# Patient Record
Sex: MALE | Race: WHITE | NOT HISPANIC OR LATINO | Employment: FULL TIME | URBAN - METROPOLITAN AREA
[De-identification: names, ages, dates, MRNs, and addresses within clinical notes are randomized per-mention and may not be internally consistent; named-entity substitution may affect disease eponyms.]

---

## 2017-01-08 ENCOUNTER — PATIENT MESSAGE (OUTPATIENT)
Dept: RHEUMATOLOGY | Facility: CLINIC | Age: 52
End: 2017-01-08

## 2017-01-08 DIAGNOSIS — M05.79 RHEUMATOID ARTHRITIS INVOLVING MULTIPLE SITES WITH POSITIVE RHEUMATOID FACTOR: Chronic | ICD-10-CM

## 2017-01-08 DIAGNOSIS — M05.79 RHEUMATOID ARTHRITIS INVOLVING MULTIPLE SITES WITH POSITIVE RHEUMATOID FACTOR: Primary | ICD-10-CM

## 2017-01-09 RX ORDER — METHOTREXATE 2.5 MG/1
20 TABLET ORAL
Qty: 96 TABLET | Refills: 1 | Status: SHIPPED | OUTPATIENT
Start: 2017-01-09 | End: 2017-01-30 | Stop reason: SDUPTHER

## 2017-01-09 RX ORDER — SULFASALAZINE 500 MG/1
1000 TABLET, DELAYED RELEASE ORAL 2 TIMES DAILY
Qty: 360 TABLET | Refills: 1 | Status: SHIPPED | OUTPATIENT
Start: 2017-01-09 | End: 2017-04-09

## 2017-01-11 ENCOUNTER — PATIENT MESSAGE (OUTPATIENT)
Dept: RHEUMATOLOGY | Facility: CLINIC | Age: 52
End: 2017-01-11

## 2017-01-23 ENCOUNTER — PATIENT MESSAGE (OUTPATIENT)
Dept: RHEUMATOLOGY | Facility: CLINIC | Age: 52
End: 2017-01-23

## 2017-01-23 DIAGNOSIS — M06.9 RHEUMATOID ARTHRITIS FLARE: ICD-10-CM

## 2017-01-23 DIAGNOSIS — M05.79 RHEUMATOID ARTHRITIS INVOLVING MULTIPLE SITES WITH POSITIVE RHEUMATOID FACTOR: Primary | ICD-10-CM

## 2017-01-27 LAB
M TB IFN-G CD4+ BCKGRND COR BLD-ACNC: <0 IU/ML
M TB IFN-G CD4+ T-CELLS BLD-ACNC: 0.04 IU/ML
M TB TUBERC IFN-G BLD QL: NEGATIVE
M TB TUBERC IGNF/MITOGEN IGNF CONTROL: >10 IU/ML

## 2017-01-29 LAB
HBV SURFACE AG SERPL IA-ACNC: <0.05 IU/ML
HCV AB S/CO SERPL IA: 0.03
HCV AB SERPL QL IA: NORMAL

## 2017-01-30 ENCOUNTER — TELEPHONE (OUTPATIENT)
Dept: RHEUMATOLOGY | Facility: CLINIC | Age: 52
End: 2017-01-30

## 2017-01-30 ENCOUNTER — CLINICAL SUPPORT (OUTPATIENT)
Dept: RHEUMATOLOGY | Facility: CLINIC | Age: 52
End: 2017-01-30
Payer: OTHER GOVERNMENT

## 2017-01-30 DIAGNOSIS — M05.79 RHEUMATOID ARTHRITIS INVOLVING MULTIPLE SITES WITH POSITIVE RHEUMATOID FACTOR: Primary | ICD-10-CM

## 2017-01-30 DIAGNOSIS — M05.79 RHEUMATOID ARTHRITIS INVOLVING MULTIPLE SITES WITH POSITIVE RHEUMATOID FACTOR: Chronic | ICD-10-CM

## 2017-01-30 PROCEDURE — 99999 PR PBB SHADOW E&M-EST. PATIENT-LVL I: CPT | Mod: PBBFAC,,,

## 2017-01-30 RX ORDER — METHOTREXATE 2.5 MG/1
25 TABLET ORAL
Qty: 120 TABLET | Refills: 1 | Status: SHIPPED | OUTPATIENT
Start: 2017-01-30 | End: 2017-02-13 | Stop reason: SDUPTHER

## 2017-01-30 NOTE — MR AVS SNAPSHOT
[facility] Rheumatology  [address] [facility] [place] [address]  Phone: [phone]  Fax: [phone]                  Byron Gutiérrez   2017 10:30 AM   Clinical Support    Description:  Male : 1965   [facility]   Department:  [facility] - Rheumatology                To Do List           Future Appointments        Provider Department Dept Phone    3/13/2017 1:00 PM Salty Hurd MD [facility] Rheumatology [phone]      Goals (5 Years of Data)     None      [facility]     [facility] -  Assistance  Registered nurses in the [facility] On Call Center provide clinical advisement, health education, appointment booking, and other advisory services.  Call for this free service at [phone].             Medications           Message regarding Medications     Verify the changes and/or additions to your medication regime listed below are the same as discussed with your clinician today.  If any of these changes or additions are incorrect, please notify your healthcare provider.             Verify that the below list of medications is an accurate representation of the medications you are currently taking.  If none reported, the list may be blank. If incorrect, please contact your healthcare provider. Carry this list with you in case of emergency.           Current Medications     adalimumab (HUMIRA) PnKt injection Inject 0.8 mLs (40 mg total) into the skin every 14 (fourteen) days.    DIPHENHYDRAMINE HCL (BENADRYL ALLERGY ORAL) Take by mouth.    folic acid (FOLVITE) 1 MG tablet Take 1 tablet (1 mg total) by mouth once daily.    methotrexate 2.5 MG Tab Take 8 tablets (20 mg total) by mouth every 7 days.    predniSONE (DELTASONE) 5 MG tablet Take 1 tablet (5 mg total) by mouth once daily.    sulfaSALAzine (AZULFIDINE) 500 MG TbEC Take 2 tablets (1,000 mg total) by mouth 2 (two) times daily.           Clinical Reference Information            Allergies as of 1/30/2017     Augmentin [Amoxicillin-pot Clavulanate]      Immunizations Administered on Date of Encounter - 1/30/2017     None      Instructions      Adalimumab Solution for injection  What is this medicine?  ADALIMUMAB (a brandon GASCA mu mab) is used to treat rheumatoid and psoriatic arthritis. It is also used to treat ankylosing spondylitis, Crohn's disease, ulcerative colitis, plaque psoriasis, hidradenitis suppurativa, and uveitis.  This medicine may be used for other purposes; ask your health care provider or pharmacist if you have questions.  What should I tell my health care provider before I take this medicine?  They need to know if you have any of these conditions:  · diabetes  · heart disease  · hepatitis B or history of hepatitis B infection  · immune system problems  · infection or history of infections  · multiple sclerosis  · recently received or scheduled to receive a vaccine  · scheduled to have surgery  · tuberculosis, a positive skin test for tuberculosis or have recently been in close contact with someone who has tuberculosis  · an unusual reaction to adalimumab, other medicines, mannitol, latex, rubber, foods, dyes, or preservatives  · pregnant or trying to get pregnant  · breast-feeding  How should I use this medicine?  This medicine is for injection under the skin. You will be taught how to prepare and give this medicine. Use exactly as directed. Take your medicine at regular intervals. Do not take your medicine more often than directed.  A special MedGuide will be given to you by the pharmacist with each prescription and refill. Be sure to read this information carefully each time.  It is important that you put your used needles and syringes in a special sharps container. Do not put them in a trash can. If you do not have a sharps container, call your pharmacist or healthcare provider to get one.  Talk to your pediatrician regarding the use of this medicine in children. While  this drug may be prescribed for children as young as 2 years for selected conditions, precautions do apply.  The  of the medicine offers free information to patients and their health care partners. Call [phone] for more information.  Overdosage: If you think you have taken too much of this medicine contact a poison control center or emergency room at once.  NOTE: This medicine is only for you. Do not share this medicine with others.  What if I miss a dose?  If you miss a dose, take it as soon as you can. If it is almost time for your next dose, take only that dose. Do not take double or extra doses. Give the next dose when your next scheduled dose is due. Call your doctor or health care professional if you are not sure how to handle a missed dose.  What may interact with this medicine?  Do not take this medicine with any of the following medications:  · abatacept  · anakinra  · etanercept  · infliximab  · live virus vaccines  · rilonacept  This medicine may also interact with the following medications:  · vaccines  This list may not describe all possible interactions. Give your health care provider a list of all the medicines, herbs, non-prescription drugs, or dietary supplements you use. Also tell them if you smoke, drink alcohol, or use illegal drugs. Some items may interact with your medicine.  What should I watch for while using this medicine?  Visit your doctor or health care professional for regular checks on your progress. Tell your doctor or healthcare professional if your symptoms do not start to get better or if they get worse.  You will be tested for tuberculosis (TB) before you start this medicine. If your doctor prescribes any medicine for TB, you should start taking the TB medicine before starting this medicine. Make sure to finish the full course of TB medicine.  Call your doctor or health care professional if you get a cold or other infection while receiving this medicine. Do  not treat yourself. This medicine may decrease your body's ability to fight infection.  Talk to your doctor about your risk of cancer. You may be more at risk for certain types of cancers if you take this medicine.  What side effects may I notice from receiving this medicine?  Side effects that you should report to your doctor or health care professional as soon as possible:  · allergic reactions like skin rash, itching or hives, swelling of the face, lips, or tongue  · breathing problems  · changes in vision  · chest pain  · fever, chills, or any other sign of infection  · numbness or tingling  · red, scaly patches or raised bumps on the skin  · swelling of the ankles  · swollen lymph nodes in the neck, underarm, or groin areas  · unexplained weight loss  · unusual bleeding or bruising  · unusually weak or tired  Side effects that usually do not require medical attention (report to your doctor or health care professional if they continue or are bothersome):  · headache  · nausea  · redness, itching, swelling, or bruising at site where injected  This list may not describe all possible side effects. Call your doctor for medical advice about side effects. You may report side effects to FDA at 9-221-FDA-6745.  Where should I keep my medicine?  Keep out of the reach of children.  Store in the original container and in the refrigerator between 2 and 8 degrees C (36 and 46 degrees F). Do not freeze. The product may be stored in a cool carrier with an ice pack, if needed. Protect from light. Throw away any unused medicine after the expiration date.  NOTE:This sheet is a summary. It may not cover all possible information. If you have questions about this medicine, talk to your doctor, pharmacist, or health care provider. Copyright© 2016 Gold Standard

## 2017-01-30 NOTE — PROGRESS NOTES
Instructed patient on how to self administer Humira injection with training pen. Pt did return demonstration with training pen as well. Signed patient up for Humira Ambassador program. Advised patient that once he receives the injection if he has any questions to give up a call or he can go to the Humira web site for instructional videos. Pt verbalized understanding.

## 2017-01-30 NOTE — PATIENT INSTRUCTIONS
Adalimumab Solution for injection  What is this medicine?  ADALIMUMAB (a brandon AYE mu mab) is used to treat rheumatoid and psoriatic arthritis. It is also used to treat ankylosing spondylitis, Crohn's disease, ulcerative colitis, plaque psoriasis, hidradenitis suppurativa, and uveitis.  This medicine may be used for other purposes; ask your health care provider or pharmacist if you have questions.  What should I tell my health care provider before I take this medicine?  They need to know if you have any of these conditions:  · diabetes  · heart disease  · hepatitis B or history of hepatitis B infection  · immune system problems  · infection or history of infections  · multiple sclerosis  · recently received or scheduled to receive a vaccine  · scheduled to have surgery  · tuberculosis, a positive skin test for tuberculosis or have recently been in close contact with someone who has tuberculosis  · an unusual reaction to adalimumab, other medicines, mannitol, latex, rubber, foods, dyes, or preservatives  · pregnant or trying to get pregnant  · breast-feeding  How should I use this medicine?  This medicine is for injection under the skin. You will be taught how to prepare and give this medicine. Use exactly as directed. Take your medicine at regular intervals. Do not take your medicine more often than directed.  A special MedGuide will be given to you by the pharmacist with each prescription and refill. Be sure to read this information carefully each time.  It is important that you put your used needles and syringes in a special sharps container. Do not put them in a trash can. If you do not have a sharps container, call your pharmacist or healthcare provider to get one.  Talk to your pediatrician regarding the use of this medicine in children. While this drug may be prescribed for children as young as 2 years for selected conditions, precautions do apply.  The  of the medicine offers free information to  patients and their health care partners. Call [phone] for more information.  Overdosage: If you think you have taken too much of this medicine contact a poison control center or emergency room at once.  NOTE: This medicine is only for you. Do not share this medicine with others.  What if I miss a dose?  If you miss a dose, take it as soon as you can. If it is almost time for your next dose, take only that dose. Do not take double or extra doses. Give the next dose when your next scheduled dose is due. Call your doctor or health care professional if you are not sure how to handle a missed dose.  What may interact with this medicine?  Do not take this medicine with any of the following medications:  · abatacept  · anakinra  · etanercept  · infliximab  · live virus vaccines  · rilonacept  This medicine may also interact with the following medications:  · vaccines  This list may not describe all possible interactions. Give your health care provider a list of all the medicines, herbs, non-prescription drugs, or dietary supplements you use. Also tell them if you smoke, drink alcohol, or use illegal drugs. Some items may interact with your medicine.  What should I watch for while using this medicine?  Visit your doctor or health care professional for regular checks on your progress. Tell your doctor or healthcare professional if your symptoms do not start to get better or if they get worse.  You will be tested for tuberculosis (TB) before you start this medicine. If your doctor prescribes any medicine for TB, you should start taking the TB medicine before starting this medicine. Make sure to finish the full course of TB medicine.  Call your doctor or health care professional if you get a cold or other infection while receiving this medicine. Do not treat yourself. This medicine may decrease your body's ability to fight infection.  Talk to your doctor about your risk of cancer. You may be more at risk for certain  types of cancers if you take this medicine.  What side effects may I notice from receiving this medicine?  Side effects that you should report to your doctor or health care professional as soon as possible:  · allergic reactions like skin rash, itching or hives, swelling of the face, lips, or tongue  · breathing problems  · changes in vision  · chest pain  · fever, chills, or any other sign of infection  · numbness or tingling  · red, scaly patches or raised bumps on the skin  · swelling of the ankles  · swollen lymph nodes in the neck, underarm, or groin areas  · unexplained weight loss  · unusual bleeding or bruising  · unusually weak or tired  Side effects that usually do not require medical attention (report to your doctor or health care professional if they continue or are bothersome):  · headache  · nausea  · redness, itching, swelling, or bruising at site where injected  This list may not describe all possible side effects. Call your doctor for medical advice about side effects. You may report side effects to FDA at 9-247-FDA-1144.  Where should I keep my medicine?  Keep out of the reach of children.  Store in the original container and in the refrigerator between 2 and 8 degrees C (36 and 46 degrees F). Do not freeze. The product may be stored in a cool carrier with an ice pack, if needed. Protect from light. Throw away any unused medicine after the expiration date.  NOTE:This sheet is a summary. It may not cover all possible information. If you have questions about this medicine, talk to your doctor, pharmacist, or health care provider. Copyright© 2016 Gold Standard

## 2017-01-30 NOTE — TELEPHONE ENCOUNTER
Humira denied by insurance because there has not been an inadequate response to MTX at a maximum titrated dose of greater than or equal to 25 mg per week after at leave months of treatment, or an intolerance confirmed adverse event, or a contraindication to MTX. Please Advise        For Appeals:   [facility] Specialty Appeals Department   [address] [place] [address]  Fax- [phone]

## 2017-02-07 ENCOUNTER — TELEPHONE (OUTPATIENT)
Dept: RHEUMATOLOGY | Facility: CLINIC | Age: 52
End: 2017-02-07

## 2017-02-07 NOTE — TELEPHONE ENCOUNTER
----- Message from Viji Diaz sent at 2/7/2017 10:16 AM CST -----  Contact: [facility] // Monisha  Calling for prior authorization of Humira 40 mg for patient. Please call Monisha @ [phone]. Thanks, godfrey

## 2017-02-13 ENCOUNTER — PATIENT MESSAGE (OUTPATIENT)
Dept: RHEUMATOLOGY | Facility: CLINIC | Age: 52
End: 2017-02-13

## 2017-02-13 DIAGNOSIS — M05.79 RHEUMATOID ARTHRITIS INVOLVING MULTIPLE SITES WITH POSITIVE RHEUMATOID FACTOR: Chronic | ICD-10-CM

## 2017-02-13 RX ORDER — METHOTREXATE 2.5 MG/1
25 TABLET ORAL
Qty: 120 TABLET | Refills: 1 | Status: SHIPPED | OUTPATIENT
Start: 2017-02-13 | End: 2017-09-08 | Stop reason: SDUPTHER

## 2017-03-13 ENCOUNTER — OFFICE VISIT (OUTPATIENT)
Dept: RHEUMATOLOGY | Facility: CLINIC | Age: 52
End: 2017-03-13
Payer: OTHER GOVERNMENT

## 2017-03-13 VITALS
SYSTOLIC BLOOD PRESSURE: 129 MMHG | DIASTOLIC BLOOD PRESSURE: 75 MMHG | WEIGHT: 177.5 LBS | BODY MASS INDEX: 29.53 KG/M2 | HEART RATE: 85 BPM

## 2017-03-13 DIAGNOSIS — M05.79 RHEUMATOID ARTHRITIS INVOLVING MULTIPLE SITES WITH POSITIVE RHEUMATOID FACTOR: Primary | ICD-10-CM

## 2017-03-13 DIAGNOSIS — Z79.899 LONG-TERM CURRENT USE OF HIGH RISK MEDICATION OTHER THAN ANTICOAGULANT: ICD-10-CM

## 2017-03-13 PROCEDURE — 1160F RVW MEDS BY RX/DR IN RCRD: CPT | Mod: S$GLB,,, | Performed by: INTERNAL MEDICINE

## 2017-03-13 PROCEDURE — 99214 OFFICE O/P EST MOD 30 MIN: CPT | Mod: S$GLB,,, | Performed by: INTERNAL MEDICINE

## 2017-03-13 PROCEDURE — 99999 PR PBB SHADOW E&M-EST. PATIENT-LVL III: CPT | Mod: PBBFAC,,, | Performed by: INTERNAL MEDICINE

## 2017-03-13 ASSESSMENT — ROUTINE ASSESSMENT OF PATIENT INDEX DATA (RAPID3): MDHAQ FUNCTION SCORE: .9

## 2017-03-13 NOTE — PROGRESS NOTES
RHEUMATOLOGY CLINIC FOLLOW UP VISIT  Chief complaints:-  My left hand hurts today.    HPI:-  Byron Garza a 51 y.o. pleasant male comes in for a follow up visit with above chief complaints. He has seropositive non erosive rheumatoid arthritis. He is on methotrexate 25 mg weekly , SSZ twice daily with prn 5 mg prednisone. He was doing fine until the weather became very cold which has caused him to have intermittent flare ups over his left foot, right wrist and hand. Today his right fourth andd fifth MCP is swollen and painful. His insurance denied humira request since he haven't been on 25 mg weekly methotrexate for more than 12 weeks. He denies any side effects from the medications except a mild increase in his fatigue in the past month or so.     Review of Systems   Constitutional: Positive for malaise/fatigue. Negative for chills, fever and weight loss.   HENT: Negative for ear discharge, ear pain, hearing loss, nosebleeds and sore throat.    Eyes: Negative for blurred vision, double vision, photophobia, discharge and redness.   Respiratory: Negative for cough, hemoptysis, sputum production and shortness of breath.    Cardiovascular: Negative for chest pain, palpitations and claudication.   Gastrointestinal: Negative for abdominal pain, constipation, diarrhea, melena, nausea and vomiting.   Genitourinary: Negative for dysuria, frequency, hematuria and urgency.   Musculoskeletal: Positive for back pain and joint pain. Negative for falls, myalgias and neck pain.   Skin: Negative for itching and rash.   Neurological: Negative for dizziness, tremors, sensory change, speech change, focal weakness, seizures, loss of consciousness, weakness and headaches.   Endo/Heme/Allergies: Negative for environmental allergies. Does not bruise/bleed easily.   Psychiatric/Behavioral: Negative for hallucinations and memory loss. The patient does not have insomnia.         Past Medical History:   Diagnosis Date    Asthma     Cluster headache     Eczema        Past Surgical History:   Procedure Laterality Date    APPENDECTOMY          Social History   Substance Use Topics    Smoking status: Former Smoker     Packs/day: 1.00     Years: 35.00     Types: Cigarettes    Smokeless tobacco: Former User    Alcohol use No       Family History   Problem Relation Age of Onset    Multiple sclerosis Mother     No Known Problems Father     Cancer Maternal Grandmother      Breast    Stroke Maternal Grandfather     Cancer Paternal Grandmother      Stomach    Heart attack Paternal Grandfather        Review of patient's allergies indicates:   Allergen Reactions    Augmentin [amoxicillin-pot clavulanate] Rash           Physical examination:-    Vitals:    03/13/17 1318   BP: 129/75   Pulse: 85   Weight: 80.5 kg (177 lb 7.5 oz)   PainSc:   3       Physical Exam   Constitutional: He is oriented to person, place, and time and well-developed, well-nourished, and in no distress. No distress.   HENT:   Head: Normocephalic and atraumatic.   Mouth/Throat: Oropharynx is clear and moist.   Eyes: Conjunctivae and EOM are normal. Pupils are equal, round, and reactive to light.   Neck: Normal range of motion. Neck supple.   Cardiovascular: Normal rate and intact distal pulses.    Pulmonary/Chest: Effort normal. No respiratory distress. He exhibits no tenderness.   Abdominal: Soft. There is no tenderness.   Musculoskeletal:   Mild synovitis over right fourth and fifth MCP joints. No other synovitis present. No effusion over large joints.    Lymphadenopathy:     He has no cervical adenopathy.   Neurological: He is alert and oriented to person, place, and time. Gait normal.   Skin: Skin is warm. He is not diaphoretic. No erythema.   Old healed skin rash markings over the pretibial region of both legs.   Psychiatric: Memory, affect and judgment normal.   Nursing note and vitals  reviewed.      Labs:-      X-rays:-  Independent visualization of images done. No erosions over hands, feet, wrists or ankles.       Medication List with Changes/Refills   Current Medications    DIPHENHYDRAMINE HCL (BENADRYL ALLERGY ORAL)    Take by mouth.    FOLIC ACID (FOLVITE) 1 MG TABLET    Take 1 tablet (1 mg total) by mouth once daily.    METHOTREXATE 2.5 MG TAB    Take 10 tablets (25 mg total) by mouth every 7 days.    PREDNISONE (DELTASONE) 5 MG TABLET    Take 1 tablet (5 mg total) by mouth once daily.    SULFASALAZINE (AZULFIDINE) 500 MG TBEC    Take 2 tablets (1,000 mg total) by mouth 2 (two) times daily.   Discontinued Medications    ADALIMUMAB (HUMIRA) PNKT INJECTION    Inject 0.8 mLs (40 mg total) into the skin every 14 (fourteen) days.       Assessment/Plans:-  # Rheumatoid arthritis involving multiple sites with positive rheumatoid factor:-  Mildly active Rheumatoid arthritis . Continue 25 mg weekly methotrexate and twice daily SSZ. Increase folic acid to twice daily and evaluate for any reduction in the fatigue. If active disease in 8 weeks, resubmit TNF inhibitor request to his insurance company.      # Long-term current use of high risk medication other than anticoagulant:-  Check every 12 week safety labs for MTX and SSZ. He gets labs done at outside labs as per his insurance company's request. External lab orders printed.   - CBC auto differential; Future  - Comprehensive metabolic panel; Future       # RTC in 3 months.     Disclaimer: This note was prepared using voice recognition system and is likely to have sound alike errors .  Please call me with any questions

## 2017-03-13 NOTE — MR AVS SNAPSHOT
[facility] - Rheumatology  [address] [facility] [place] [address]  Phone: [phone]  Fax: [phone]                  Byron Gutiérrez   3/13/2017 1:00 PM   Office Visit    Description:  Male : 1965   Provider:  Salty Hurd MD   Department:  [facility] - Rheumatology           Reason for Visit     Rheumatoid Arthritis           Diagnoses this Visit        Comments    Rheumatoid arthritis involving multiple sites with positive rheumatoid factor    -  Primary     Long-term current use of high risk medication other than anticoagulant                To Do List           Goals (5 Years of Data)     None      Follow-Up and Disposition     Return in about 3 months (around 2017).      [facility]     [facility] On Call Nurse [facility] -  Assistance  Registered nurses in the [facility] On Call Center provide clinical advisement, health education, appointment booking, and other advisory services.  Call for this free service at [phone].             Medications           Message regarding Medications     Verify the changes and/or additions to your medication regime listed below are the same as discussed with your clinician today.  If any of these changes or additions are incorrect, please notify your healthcare provider.        STOP taking these medications     adalimumab (HUMIRA) PnKt injection Inject 0.8 mLs (40 mg total) into the skin every 14 (fourteen) days.           Verify that the below list of medications is an accurate representation of the medications you are currently taking.  If none reported, the list may be blank. If incorrect, please contact your healthcare provider. Carry this list with you in case of emergency.           Current Medications     DIPHENHYDRAMINE HCL (BENADRYL ALLERGY ORAL) Take by mouth.    folic acid (FOLVITE) 1 MG tablet Take 1 tablet (1 mg total) by mouth once daily.    methotrexate 2.5 MG Tab Take 10 tablets (25 mg total) by mouth every 7 days.     sulfaSALAzine (AZULFIDINE) 500 MG TbEC Take 2 tablets (1,000 mg total) by mouth 2 (two) times daily.    predniSONE (DELTASONE) 5 MG tablet Take 1 tablet (5 mg total) by mouth once daily.           Clinical Reference Information           Your Vitals Were     BP Pulse Weight BMI       129/75 85 80.5 kg (177 lb 7.5 oz) 29.53 kg/m2       Blood Pressure          Most Recent Value    BP  129/75      Allergies as of 3/13/2017     Augmentin [Amoxicillin-pot Clavulanate]      Immunizations Administered on Date of Encounter - 3/13/2017     None      Orders Placed During Today's Visit     Future Labs/Procedures Expected by Expires    CBC auto differential  3/13/2017 5/12/2018    Comprehensive metabolic panel  3/13/2017 5/12/2018      Language Assistance Services     ATTENTION: Language assistance services are available, free of charge. Please call [phone].      ATENCIÓN: Si habla español, tiene a chan disposición servicios gratuitos de asistencia lingüística. Llame al [phone].     CHÚ Ý: N?u b?n nói Ti?ng Vi?t, có các d?ch v? h? tr? ngôn ng? mi?n phí dành cho b?n. G?i s? [phone].         [facility] - Rheumatology complies with applicable Federal civil rights laws and does not discriminate on the basis of race, color, national origin, age, disability, or sex.

## 2017-03-15 ENCOUNTER — PATIENT MESSAGE (OUTPATIENT)
Dept: RHEUMATOLOGY | Facility: CLINIC | Age: 52
End: 2017-03-15

## 2017-04-14 ENCOUNTER — PATIENT MESSAGE (OUTPATIENT)
Dept: RHEUMATOLOGY | Facility: CLINIC | Age: 52
End: 2017-04-14

## 2017-04-14 DIAGNOSIS — T45.1X5A: Primary | ICD-10-CM

## 2017-04-14 DIAGNOSIS — M05.79 RHEUMATOID ARTHRITIS INVOLVING MULTIPLE SITES WITH POSITIVE RHEUMATOID FACTOR: Chronic | ICD-10-CM

## 2017-04-17 RX ORDER — FOLIC ACID 1 MG/1
1 TABLET ORAL 2 TIMES DAILY
Qty: 180 TABLET | Refills: 3 | Status: SHIPPED | OUTPATIENT
Start: 2017-04-17 | End: 2017-04-18 | Stop reason: SDUPTHER

## 2017-04-18 ENCOUNTER — PATIENT MESSAGE (OUTPATIENT)
Dept: RHEUMATOLOGY | Facility: CLINIC | Age: 52
End: 2017-04-18

## 2017-04-18 DIAGNOSIS — M05.79 RHEUMATOID ARTHRITIS INVOLVING MULTIPLE SITES WITH POSITIVE RHEUMATOID FACTOR: Chronic | ICD-10-CM

## 2017-04-18 DIAGNOSIS — T45.1X5A: ICD-10-CM

## 2017-04-18 RX ORDER — FOLIC ACID 1 MG/1
1 TABLET ORAL 2 TIMES DAILY
Qty: 180 TABLET | Refills: 3 | Status: SHIPPED | OUTPATIENT
Start: 2017-04-18 | End: 2018-01-24 | Stop reason: SDUPTHER

## 2017-07-07 ENCOUNTER — OFFICE VISIT (OUTPATIENT)
Dept: RHEUMATOLOGY | Facility: CLINIC | Age: 52
End: 2017-07-07
Payer: OTHER GOVERNMENT

## 2017-07-07 VITALS
DIASTOLIC BLOOD PRESSURE: 69 MMHG | BODY MASS INDEX: 30.01 KG/M2 | SYSTOLIC BLOOD PRESSURE: 119 MMHG | WEIGHT: 180.31 LBS | HEART RATE: 86 BPM

## 2017-07-07 DIAGNOSIS — Z79.899 LONG-TERM CURRENT USE OF HIGH RISK MEDICATION OTHER THAN ANTICOAGULANT: ICD-10-CM

## 2017-07-07 DIAGNOSIS — M05.79 RHEUMATOID ARTHRITIS INVOLVING MULTIPLE SITES WITH POSITIVE RHEUMATOID FACTOR: Primary | ICD-10-CM

## 2017-07-07 PROCEDURE — 99214 OFFICE O/P EST MOD 30 MIN: CPT | Mod: S$GLB,,, | Performed by: INTERNAL MEDICINE

## 2017-07-07 PROCEDURE — 99999 PR PBB SHADOW E&M-EST. PATIENT-LVL III: CPT | Mod: PBBFAC,,, | Performed by: INTERNAL MEDICINE

## 2017-07-07 RX ORDER — SULFASALAZINE 500 MG/1
500 TABLET, DELAYED RELEASE ORAL 2 TIMES DAILY
Qty: 60 TABLET | Refills: 2 | Status: SHIPPED | OUTPATIENT
Start: 2017-09-07 | End: 2017-12-29 | Stop reason: SDUPTHER

## 2017-07-07 ASSESSMENT — ROUTINE ASSESSMENT OF PATIENT INDEX DATA (RAPID3): MDHAQ FUNCTION SCORE: 1.8

## 2017-07-07 NOTE — ASSESSMENT & PLAN NOTE
On methotrexate and sulfasalazine therapy.  High risk for hepatic and pulmonary toxicity.  Check safety labs today.  Repeat every 16 weeks.

## 2017-07-07 NOTE — PROGRESS NOTES
RHEUMATOLOGY CLINIC FOLLOW UP VISIT  Chief complaints:-  My shoulders and knees and ankles are stiff.  HPI:-  Byron Garza a 51 y.o. pleasant male comes in for a follow up visit with above chief complaints.  He has seropositive nonerosive rheumatoid arthritis.  He is on methotrexate and sulfasalazine therapy with 5 mg daily prednisone.  He reports intermittent activity of his rheumatoid arthritis.  He usually gets large joint involvement.  Today morning he woke up with no pain but after driving down to the clinic he has stiffness around his shoulders, knees and ankles.  He denies any pain over small joints of hands or feet.  No adverse effects from the medications.  Tolerating both very well.      Review of Systems   Constitutional: Negative for chills and fever.   HENT: Negative for nosebleeds and sore throat.    Eyes: Negative for blurred vision, photophobia and redness.   Respiratory: Negative for cough, sputum production and shortness of breath.    Cardiovascular: Negative for chest pain and leg swelling.   Gastrointestinal: Negative for abdominal pain, constipation and diarrhea.   Genitourinary: Negative for dysuria, frequency and urgency.   Musculoskeletal: Positive for joint pain. Negative for back pain, falls, myalgias and neck pain.   Skin: Negative for itching and rash.   Neurological: Negative for dizziness, tremors, seizures, loss of consciousness, weakness and headaches.   Endo/Heme/Allergies: Negative for environmental allergies. Does not bruise/bleed easily.   Psychiatric/Behavioral: Negative for hallucinations and memory loss. The patient does not have insomnia.        Past Medical History:   Diagnosis Date    Asthma     Cluster headache     Eczema        Past Surgical History:   Procedure Laterality Date    APPENDECTOMY          Social History   Substance Use Topics    Smoking status: Former Smoker     Packs/day: 1.00     Years:  35.00     Types: Cigarettes    Smokeless tobacco: Former User    Alcohol use No       Family History   Problem Relation Age of Onset    Multiple sclerosis Mother     No Known Problems Father     Cancer Maternal Grandmother      Breast    Stroke Maternal Grandfather     Cancer Paternal Grandmother      Stomach    Heart attack Paternal Grandfather        Review of patient's allergies indicates:   Allergen Reactions    Augmentin [amoxicillin-pot clavulanate] Rash           Physical examination:-    Vitals:    07/07/17 1357   BP: 119/69   Pulse: 86   Weight: 81.8 kg (180 lb 5.4 oz)   PainSc:   8   PainLoc: Generalized       Physical Exam   Constitutional: He is oriented to person, place, and time and well-developed, well-nourished, and in no distress. No distress.   HENT:   Head: Normocephalic and atraumatic.   Mouth/Throat: Oropharynx is clear and moist.   Eyes: Conjunctivae and EOM are normal. Pupils are equal, round, and reactive to light.   Neck: Normal range of motion. Neck supple.   Cardiovascular: Normal rate and intact distal pulses.    Pulmonary/Chest: Effort normal. No respiratory distress. He exhibits no tenderness.   Abdominal: Soft. There is no tenderness.   Musculoskeletal:   No synovitis over the MCPs, PIPs, DIPs and wrists. No effusion over large joints.  Mild tenderness over bilateral knees and ankles.   Lymphadenopathy:     He has no cervical adenopathy.   Neurological: He is alert and oriented to person, place, and time. Gait normal.   Skin: Skin is warm. He is not diaphoretic. No erythema.   Old healed skin rash markings over the pretibial region of both legs.   Psychiatric: Memory, affect and judgment normal.   Nursing note and vitals reviewed.      Labs:-            Medication List with Changes/Refills   New Medications    SULFASALAZINE (AZULFIDINE) 500 MG TBEC    Take 1 tablet (500 mg total) by mouth 2 (two) times daily.   Current Medications    DIPHENHYDRAMINE HCL (BENADRYL ALLERGY ORAL)     Take by mouth.    FOLIC ACID (FOLVITE) 1 MG TABLET    Take 1 tablet (1 mg total) by mouth 2 (two) times daily.    METHOTREXATE 2.5 MG TAB    Take 10 tablets (25 mg total) by mouth every 7 days.    PREDNISONE (DELTASONE) 5 MG TABLET    Take 1 tablet (5 mg total) by mouth once daily.       Assessment/Plans:-  # Rheumatoid arthritis involving multiple sites with positive rheumatoid factor  Intermittently active rheumatoid arthritis.  On methotrexate and sulfasalazine dual therapy at this time with 5 mg daily prednisone.  No active synovitis on exam today but the palindromic activity suggests initiation of TNF alpha inhibitor therapy.  I discussed in detail about Humira/enbrel.  But he wants to hold off for now since he is not ready to start Biologics at this time.  Continue dual therapy with prednisone.  Call with any worsening symptoms.  - sulfaSALAzine (AZULFIDINE) 500 MG TbEC; Take 1 tablet (500 mg total) by mouth 2 (two) times daily.  Dispense: 60 tablet; Refill: 2  - Comprehensive metabolic panel; Future  - C-reactive protein; Future  - CBC auto differential; Future  - Sedimentation rate, manual; Future    # Long-term current use of high risk medication other than anticoagulant  On methotrexate and sulfasalazine therapy.  High risk for hepatic and pulmonary toxicity.  Check safety labs today.  Repeat every 16 weeks.     # Return in about 4 months (around 11/7/2017).      Time spent: 30 minutes in face to face discussion concerning diagnosis, prognosis, review of lab and test results, benefits of treatment as well as management of disease, counseling of patient and coordination of care between various health care providers . Greater than half the time spent was used for coordination of care and counseling of patient.      Disclaimer: This note was prepared using voice recognition system and is likely to have sound alike errors and is not proof read.  Please call me with any questions.

## 2017-07-07 NOTE — ASSESSMENT & PLAN NOTE
Intermittently active rheumatoid arthritis.  On methotrexate and sulfasalazine dual therapy at this time with 5 mg daily prednisone.  No active synovitis on exam today but the palindromic activity suggests initiation of TNF alpha inhibitor therapy.  I discussed in detail about Humira/enbrel.  But he wants to hold off for now since he is not ready to start Biologics at this time.  Continue dual therapy with prednisone.  Call with any worsening symptoms.

## 2017-09-02 DIAGNOSIS — M05.79 RHEUMATOID ARTHRITIS INVOLVING MULTIPLE SITES WITH POSITIVE RHEUMATOID FACTOR: Chronic | ICD-10-CM

## 2017-09-02 RX ORDER — PREDNISONE 5 MG/1
TABLET ORAL
Qty: 90 TABLET | Refills: 1 | Status: SHIPPED | OUTPATIENT
Start: 2017-09-02 | End: 2018-01-24 | Stop reason: SDUPTHER

## 2017-09-07 ENCOUNTER — PATIENT MESSAGE (OUTPATIENT)
Dept: RHEUMATOLOGY | Facility: CLINIC | Age: 52
End: 2017-09-07

## 2017-09-07 DIAGNOSIS — M05.79 RHEUMATOID ARTHRITIS INVOLVING MULTIPLE SITES WITH POSITIVE RHEUMATOID FACTOR: Chronic | ICD-10-CM

## 2017-09-08 ENCOUNTER — PATIENT MESSAGE (OUTPATIENT)
Dept: RHEUMATOLOGY | Facility: CLINIC | Age: 52
End: 2017-09-08

## 2017-09-08 RX ORDER — METHOTREXATE 2.5 MG/1
25 TABLET ORAL
Qty: 120 TABLET | Refills: 1 | Status: SHIPPED | OUTPATIENT
Start: 2017-09-08 | End: 2018-01-24 | Stop reason: SDUPTHER

## 2017-12-29 DIAGNOSIS — M05.79 RHEUMATOID ARTHRITIS INVOLVING MULTIPLE SITES WITH POSITIVE RHEUMATOID FACTOR: ICD-10-CM

## 2017-12-29 RX ORDER — SULFASALAZINE 500 MG/1
500 TABLET, DELAYED RELEASE ORAL 2 TIMES DAILY
Qty: 60 TABLET | Refills: 2 | Status: SHIPPED | OUTPATIENT
Start: 2017-12-29 | End: 2018-01-24 | Stop reason: SDUPTHER

## 2018-01-24 ENCOUNTER — OFFICE VISIT (OUTPATIENT)
Dept: RHEUMATOLOGY | Facility: CLINIC | Age: 53
End: 2018-01-24
Payer: OTHER GOVERNMENT

## 2018-01-24 VITALS
HEART RATE: 86 BPM | BODY MASS INDEX: 30.05 KG/M2 | WEIGHT: 180.56 LBS | SYSTOLIC BLOOD PRESSURE: 121 MMHG | DIASTOLIC BLOOD PRESSURE: 75 MMHG

## 2018-01-24 DIAGNOSIS — Z79.899 LONG-TERM CURRENT USE OF HIGH RISK MEDICATION OTHER THAN ANTICOAGULANT: ICD-10-CM

## 2018-01-24 DIAGNOSIS — M05.79 RHEUMATOID ARTHRITIS INVOLVING MULTIPLE SITES WITH POSITIVE RHEUMATOID FACTOR: Chronic | ICD-10-CM

## 2018-01-24 DIAGNOSIS — M05.79 RHEUMATOID ARTHRITIS INVOLVING MULTIPLE SITES WITH POSITIVE RHEUMATOID FACTOR: Primary | ICD-10-CM

## 2018-01-24 PROCEDURE — 99215 OFFICE O/P EST HI 40 MIN: CPT | Mod: S$GLB,,, | Performed by: INTERNAL MEDICINE

## 2018-01-24 PROCEDURE — 99999 PR PBB SHADOW E&M-EST. PATIENT-LVL III: CPT | Mod: PBBFAC,,, | Performed by: INTERNAL MEDICINE

## 2018-01-24 RX ORDER — FOLIC ACID 1 MG/1
1 TABLET ORAL 2 TIMES DAILY
Qty: 180 TABLET | Refills: 3 | Status: SHIPPED | OUTPATIENT
Start: 2018-01-24

## 2018-01-24 RX ORDER — METHOTREXATE 2.5 MG/1
25 TABLET ORAL
Qty: 120 TABLET | Refills: 1 | Status: SHIPPED | OUTPATIENT
Start: 2018-01-24 | End: 2018-08-10 | Stop reason: SDUPTHER

## 2018-01-24 RX ORDER — PREDNISONE 5 MG/1
5 TABLET ORAL DAILY
Qty: 90 TABLET | Refills: 0 | Status: SHIPPED | OUTPATIENT
Start: 2018-01-24 | End: 2018-04-16 | Stop reason: SDUPTHER

## 2018-01-24 RX ORDER — SULFASALAZINE 500 MG/1
1000 TABLET, DELAYED RELEASE ORAL 2 TIMES DAILY
Qty: 360 TABLET | Refills: 1 | Status: SHIPPED | OUTPATIENT
Start: 2018-01-24

## 2018-01-24 ASSESSMENT — ROUTINE ASSESSMENT OF PATIENT INDEX DATA (RAPID3): MDHAQ FUNCTION SCORE: 1.5

## 2018-01-24 NOTE — PROGRESS NOTES
RHEUMATOLOGY CLINIC FOLLOW UP VISIT  Chief complaints:-  My shoulders and knees and ankles are stiff. The joints are not doing well .     HPI:-  Byron Garza a 52 y.o. pleasant male comes in for a follow up visit with above chief complaints.  He has seropositive nonerosive rheumatoid arthritis.  He is on methotrexate and sulfasalazine therapy with 5 mg daily prednisone.  He reports intermittent activity of his rheumatoid arthritis.  He usually gets large joint involvement.   He denies any pain over small joints of hands or feet.  No adverse effects from the medications.  Tolerating both very well.  Review of Systems   Constitutional: Negative for chills and fever.   HENT: Negative for nosebleeds and sore throat.    Eyes: Negative for blurred vision, photophobia and redness.   Respiratory: Negative for cough, sputum production and shortness of breath.    Cardiovascular: Negative for chest pain and leg swelling.   Gastrointestinal: Negative for abdominal pain, constipation and diarrhea.   Genitourinary: Negative for dysuria, frequency and urgency.   Musculoskeletal: Positive for joint pain. Negative for back pain, falls, myalgias and neck pain.   Skin: Negative for itching and rash.   Neurological: Negative for dizziness, tremors, seizures, loss of consciousness, weakness and headaches.   Endo/Heme/Allergies: Negative for environmental allergies. Does not bruise/bleed easily.   Psychiatric/Behavioral: Negative for hallucinations and memory loss. The patient does not have insomnia.        Past Medical History:   Diagnosis Date    Asthma     Cluster headache     Eczema        Past Surgical History:   Procedure Laterality Date    APPENDECTOMY          Social History   Substance Use Topics    Smoking status: Former Smoker     Packs/day: 1.00     Years: 35.00     Types: Cigarettes    Smokeless tobacco: Former User    Alcohol use No       Family History    Problem Relation Age of Onset    Multiple sclerosis Mother     No Known Problems Father     Cancer Maternal Grandmother      Breast    Stroke Maternal Grandfather     Cancer Paternal Grandmother      Stomach    Heart attack Paternal Grandfather        Review of patient's allergies indicates:   Allergen Reactions    Augmentin [amoxicillin-pot clavulanate] Rash           Physical examination:-    Vitals:    01/24/18 1125   BP: 121/75   Pulse: 86   Weight: 81.9 kg (180 lb 8.9 oz)   PainSc:   5   PainLoc: Generalized       Physical Exam   Constitutional: He is oriented to person, place, and time and well-developed, well-nourished, and in no distress. No distress.   HENT:   Head: Normocephalic and atraumatic.   Mouth/Throat: Oropharynx is clear and moist.   Eyes: Conjunctivae and EOM are normal. Pupils are equal, round, and reactive to light.   Neck: Normal range of motion. Neck supple.   Cardiovascular: Normal rate and intact distal pulses.    Pulmonary/Chest: Effort normal. No respiratory distress. He exhibits no tenderness.   Abdominal: Soft. There is no tenderness.   Musculoskeletal:   No synovitis over the MCPs, PIPs, DIPs and wrists. No effusion over large joints.  Mild tenderness over bilateral knees and ankles.   Lymphadenopathy:     He has no cervical adenopathy.   Neurological: He is alert and oriented to person, place, and time. Gait normal.   Skin: Skin is warm. He is not diaphoretic. No erythema.   Old healed skin rash markings over the pretibial region of both legs.   Psychiatric: Memory, affect and judgment normal.   Nursing note and vitals reviewed.      Labs:-            Medication List with Changes/Refills   Current Medications    DIPHENHYDRAMINE HCL (BENADRYL ALLERGY ORAL)    Take by mouth.   Changed and/or Refilled Medications    Modified Medication Previous Medication    FOLIC ACID (FOLVITE) 1 MG TABLET folic acid (FOLVITE) 1 MG tablet       Take 1 tablet (1 mg total) by mouth 2 (two) times  daily.    Take 1 tablet (1 mg total) by mouth 2 (two) times daily.    METHOTREXATE 2.5 MG TAB methotrexate 2.5 MG Tab       Take 10 tablets (25 mg total) by mouth every 7 days.    Take 10 tablets (25 mg total) by mouth every 7 days.    PREDNISONE (DELTASONE) 5 MG TABLET predniSONE (DELTASONE) 5 MG tablet       Take 1 tablet (5 mg total) by mouth once daily.    TAKE 1 TABLET ONCE DAILY    SULFASALAZINE (AZULFIDINE) 500 MG TBEC sulfaSALAzine (AZULFIDINE) 500 MG TbEC       Take 2 tablets (1,000 mg total) by mouth 2 (two) times daily.    TAKE 1 TABLET (500 MG TOTAL) BY MOUTH 2 (TWO) TIMES DAILY.       Assessment/Plans:-  # Rheumatoid arthritis involving multiple sites with positive rheumatoid factor  Intermittently active rheumatoid arthritis.  On methotrexate and sulfasalazine dual therapy at this time with 5 mg daily prednisone.  No active synovitis on exam today but the palindromic activity suggests initiation of TNF alpha inhibitor therapy.  I discussed in detail about Humira/enbrel.  But he wants to hold off for now since he is not ready to start Biologics at this time.  Increase dose of SSZ to 1 gram bid.   - sulfaSALAzine (AZULFIDINE) 500 MG TbEC; Take 2 tablets (1,000 mg total) by mouth 2 (two) times daily.  Dispense: 360 tablet; Refill: 1  - methotrexate 2.5 MG Tab; Take 10 tablets (25 mg total) by mouth every 7 days.  Dispense: 120 tablet; Refill: 1  - folic acid (FOLVITE) 1 MG tablet; Take 1 tablet (1 mg total) by mouth 2 (two) times daily.  Dispense: 180 tablet; Refill: 3  - predniSONE (DELTASONE) 5 MG tablet; Take 1 tablet (5 mg total) by mouth once daily.  Dispense: 90 tablet; Refill: 0  - CBC auto differential; Future  - C-reactive protein; Future  - Sedimentation rate, manual; Future  - Comprehensive metabolic panel; Future  - CBC auto differential  - C-reactive protein  - Sedimentation rate, manual  - Comprehensive metabolic panel    # Long-term current use of high risk medication other than  anticoagulant  On methotrexate and sulfasalazine therapy.  High risk for hepatic and pulmonary toxicity.  Check safety labs today.  Repeat every 16 weeks.   - sulfaSALAzine (AZULFIDINE) 500 MG TbEC; Take 2 tablets (1,000 mg total) by mouth 2 (two) times daily.  Dispense: 360 tablet; Refill: 1  - methotrexate 2.5 MG Tab; Take 10 tablets (25 mg total) by mouth every 7 days.  Dispense: 120 tablet; Refill: 1  - folic acid (FOLVITE) 1 MG tablet; Take 1 tablet (1 mg total) by mouth 2 (two) times daily.  Dispense: 180 tablet; Refill: 3  - predniSONE (DELTASONE) 5 MG tablet; Take 1 tablet (5 mg total) by mouth once daily.  Dispense: 90 tablet; Refill: 0  - CBC auto differential; Future  - C-reactive protein; Future  - Sedimentation rate, manual; Future  - Comprehensive metabolic panel; Future  - CBC auto differential  - C-reactive protein  - Sedimentation rate, manual  - Comprehensive metabolic panel    # Follow-up in about 3 months (around 4/24/2018).    Disclaimer: This note was prepared using voice recognition system and is likely to have sound alike errors and is not proof read.  Please call me with any questions.

## 2018-04-02 ENCOUNTER — DOCUMENTATION ONLY (OUTPATIENT)
Dept: RHEUMATOLOGY | Facility: CLINIC | Age: 53
End: 2018-04-02

## 2018-04-02 ENCOUNTER — PATIENT MESSAGE (OUTPATIENT)
Dept: RHEUMATOLOGY | Facility: CLINIC | Age: 53
End: 2018-04-02

## 2018-04-02 DIAGNOSIS — Z79.899 LONG-TERM CURRENT USE OF HIGH RISK MEDICATION OTHER THAN ANTICOAGULANT: ICD-10-CM

## 2018-04-02 DIAGNOSIS — M05.79 RHEUMATOID ARTHRITIS INVOLVING MULTIPLE SITES WITH POSITIVE RHEUMATOID FACTOR: Primary | ICD-10-CM

## 2018-04-02 NOTE — PROGRESS NOTES
Labs received from [facility]. Stable with mild abnormality of AST/ALT- better than last value with high inflammation. Repeat labs at least a week before next visit.

## 2018-04-03 DIAGNOSIS — M05.79 RHEUMATOID ARTHRITIS INVOLVING MULTIPLE SITES WITH POSITIVE RHEUMATOID FACTOR: Primary | ICD-10-CM

## 2018-04-15 DIAGNOSIS — M05.79 RHEUMATOID ARTHRITIS INVOLVING MULTIPLE SITES WITH POSITIVE RHEUMATOID FACTOR: Chronic | ICD-10-CM

## 2018-04-16 RX ORDER — PREDNISONE 5 MG/1
TABLET ORAL
Qty: 90 TABLET | Refills: 1 | Status: SHIPPED | OUTPATIENT
Start: 2018-04-16

## 2018-08-10 DIAGNOSIS — M05.79 RHEUMATOID ARTHRITIS INVOLVING MULTIPLE SITES WITH POSITIVE RHEUMATOID FACTOR: ICD-10-CM

## 2018-08-10 DIAGNOSIS — Z79.899 LONG-TERM CURRENT USE OF HIGH RISK MEDICATION OTHER THAN ANTICOAGULANT: ICD-10-CM

## 2018-08-13 RX ORDER — METHOTREXATE 2.5 MG/1
TABLET ORAL
Qty: 120 TABLET | Refills: 0 | Status: SHIPPED | OUTPATIENT
Start: 2018-08-13